# Patient Record
Sex: FEMALE | Race: OTHER | HISPANIC OR LATINO | URBAN - METROPOLITAN AREA
[De-identification: names, ages, dates, MRNs, and addresses within clinical notes are randomized per-mention and may not be internally consistent; named-entity substitution may affect disease eponyms.]

---

## 2018-11-05 ENCOUNTER — EMERGENCY (EMERGENCY)
Age: 83
LOS: 1 days | Discharge: HOME | End: 2018-11-05
Admitting: EMERGENCY MEDICINE
Payer: MEDICARE

## 2018-11-05 VITALS
OXYGEN SATURATION: 98 % | SYSTOLIC BLOOD PRESSURE: 152 MMHG | RESPIRATION RATE: 16 BRPM | DIASTOLIC BLOOD PRESSURE: 77 MMHG | TEMPERATURE: 98 F | HEART RATE: 73 BPM

## 2018-11-05 VITALS
DIASTOLIC BLOOD PRESSURE: 67 MMHG | HEART RATE: 92 BPM | SYSTOLIC BLOOD PRESSURE: 175 MMHG | OXYGEN SATURATION: 98 % | TEMPERATURE: 98 F | RESPIRATION RATE: 22 BRPM

## 2018-11-05 PROCEDURE — 99282 EMERGENCY DEPT VISIT SF MDM: CPT

## 2018-11-05 NOTE — ED ADULT NURSE NOTE - OBJECTIVE STATEMENT
Patient sent by psychiatrist for depression and fellings of hopelessness. Pt evaluated and preparations are being made for patients safety and family/placement/home.   DESTIN Stephenson

## 2018-11-05 NOTE — ED PROVIDER NOTE - OBJECTIVE STATEMENT
This is a 83 year Female This is a 83 year Female PMHX MDD BIBA for eval r/t increased anxiety and depression. ON arrival patient is anxious. Reports medications changes and stats ' I just want to feel normal" Recently switching psychiatric doctors and changes in meds have caused increased anxiety and depression. [employer]queria rodriguez SI or any rios to hurt herself Denies any past psych admissions. Patient refusing voluntary admission,.

## 2018-11-05 NOTE — ED BEHAVIORAL HEALTH NOTE - BEHAVIORAL HEALTH NOTE
Writer called [facility], [phone], and spoke with Junior, who stated that the patient's Medicaid is from [facility], [phone]. Writer called that number and spoke with Saroj, who provided confirmation # [id number], for liver service, to be provided by [email or web address], [phone], with an ETA of 6:30PM, to transport her to her address, verified to be: [address], [place] [address].

## 2018-11-05 NOTE — ED ADULT TRIAGE NOTE - CHIEF COMPLAINT QUOTE
Pt sent by her psychiatrist for increased depression and feelings of hopelessness. Pt denies SI/HI at this time. Pt has a hx of depression and anxiety.

## 2018-11-05 NOTE — ED PROVIDER NOTE - MEDICAL DECISION MAKING DETAILS
This is a 83 year Female PMHX MDD BIBA for eval r/t increased anxiety and depression. ON arrival patient is anxious. Reports medications changes and stats ' I just want to feel normal" Recently switching psychiatric doctors and changes in meds have caused increased anxiety and depression.  Medical evaluation performed. There is no clinical evidence of intoxication or any acute medical problem requiring immediate intervention.

## 2018-11-05 NOTE — ED PEDIATRIC TRIAGE NOTE - CHIEF COMPLAINT QUOTE
Sent to ED by psychiatrist for admission. Pt with hx of major depressive disorder and anxiety with increased feelings of hopelessness. Pt denies current SI/HI. Does have thoughts of wanting to hurt self but has never acted on them.

## 2018-11-05 NOTE — ED ADULT NURSE NOTE - NSIMPLEMENTINTERV_GEN_ALL_ED
Implemented All Fall Risk Interventions:  [place] to call system. Call bell, personal items and telephone within reach. Instruct patient to call for assistance. Room bathroom lighting operational. Non-slip footwear when patient is off stretcher. Physically safe environment: no spills, clutter or unnecessary equipment. Stretcher in lowest position, wheels locked, appropriate side rails in place. Provide visual cue, wrist band, yellow gown, etc. Monitor gait and stability. Monitor for mental status changes and reorient to person, place, and time. Review medications for side effects contributing to fall risk. Reinforce activity limits and safety measures with patient and family.

## 2018-11-05 NOTE — ED PROVIDER NOTE - CHPI ED SYMPTOMS NEG
no homicidal/no change in level of consciousness/no paranoia/no weakness/no hallucinations/no weight loss/no disorientation/no suicidal